# Patient Record
Sex: FEMALE | Race: WHITE | NOT HISPANIC OR LATINO | Employment: FULL TIME | URBAN - METROPOLITAN AREA
[De-identification: names, ages, dates, MRNs, and addresses within clinical notes are randomized per-mention and may not be internally consistent; named-entity substitution may affect disease eponyms.]

---

## 2023-12-28 ENCOUNTER — TRANSFERRED RECORDS (OUTPATIENT)
Dept: HEALTH INFORMATION MANAGEMENT | Facility: CLINIC | Age: 28
End: 2023-12-28
Payer: COMMERCIAL

## 2023-12-28 ENCOUNTER — PRE VISIT (OUTPATIENT)
Dept: MATERNAL FETAL MEDICINE | Facility: CLINIC | Age: 28
End: 2023-12-28
Payer: COMMERCIAL

## 2023-12-28 ENCOUNTER — MEDICAL CORRESPONDENCE (OUTPATIENT)
Dept: HEALTH INFORMATION MANAGEMENT | Facility: CLINIC | Age: 28
End: 2023-12-28
Payer: COMMERCIAL

## 2023-12-28 ENCOUNTER — TRANSCRIBE ORDERS (OUTPATIENT)
Dept: MATERNAL FETAL MEDICINE | Facility: CLINIC | Age: 28
End: 2023-12-28

## 2023-12-28 DIAGNOSIS — O26.90 PREGNANCY RELATED CONDITION, ANTEPARTUM: Primary | ICD-10-CM

## 2024-01-03 ENCOUNTER — HOSPITAL ENCOUNTER (OUTPATIENT)
Dept: ULTRASOUND IMAGING | Facility: CLINIC | Age: 29
Discharge: HOME | End: 2024-01-03
Attending: OBSTETRICS & GYNECOLOGY
Payer: COMMERCIAL

## 2024-01-03 ENCOUNTER — OFFICE VISIT (OUTPATIENT)
Dept: MATERNAL FETAL MEDICINE | Facility: CLINIC | Age: 29
End: 2024-01-03
Attending: OBSTETRICS & GYNECOLOGY
Payer: COMMERCIAL

## 2024-01-03 DIAGNOSIS — O35.EXX0 FETAL HYDRONEPHROSIS DURING PREGNANCY, ANTEPARTUM, SINGLE OR UNSPECIFIED FETUS: Primary | ICD-10-CM

## 2024-01-03 DIAGNOSIS — O26.90 PREGNANCY RELATED CONDITION, ANTEPARTUM: ICD-10-CM

## 2024-01-03 PROCEDURE — 99203 OFFICE O/P NEW LOW 30 MIN: CPT | Mod: 25 | Performed by: OBSTETRICS & GYNECOLOGY

## 2024-01-03 PROCEDURE — 76811 OB US DETAILED SNGL FETUS: CPT

## 2024-01-03 PROCEDURE — 76811 OB US DETAILED SNGL FETUS: CPT | Mod: 26 | Performed by: OBSTETRICS & GYNECOLOGY

## 2024-01-03 NOTE — PROGRESS NOTES
Please see full imaging report from [facility] program under imaging tab.    Thank-you for referring your patient for a comprehensive ultrasound.    I discussed the findings on today's ultrasound with the patient and her partner. I reviewed the limitations of ultrasound both in detecting aneuploidy and structural abnormalities.  Ultrasound can routinely detect 80-90% of structural abnormalities. She opted not to do aneuploidy screening in this pregnancy.    Today we reviewed that there is evidence of left sided urinary tract dilation seen today. We discussed that this is defined at her gestational age as a renal pelvis measuring > 7mm. We discussed that the measurement we obtained today was 17-22 mm on the left consistent with a diagnosis of urinary tract dilation (UTD). We reviewed that a pelvic measurement of 10 mm or greater, however, increases the risk for renal injury.  We discussed that the differential diagnosis for UTD includes ureteropelvic junction obstruction, a lower urinary tract obstruction/malformation, or reflux - findings today are most consistent with ureteropelvic junction obstruction (UPJ). Given the increased risk for renal injury we will attempt to arrange for a Pediatric Urology consultation prior to deliver in order to establish a  follow-up plan, but given the later gestational age this may not be possible. We have reached out to our pediatric urology team today. The baby's pediatrician should also be made aware to ensure appropriate follow-up is arranged. We briefly reviewed that postnatally, the baby should undergo ultrasound and workup with Pediatric Urology.     **If we are unable to arrange a pediatric urology consultation prior to birth, the delivery site pediatric team should be notified to arrange a  US and to discuss referral to pediatric urology. We have asked our team to contact the patient for recommendations for next steps after birth for the baby, especially if  they are unable to perform a consult prior to her delivery.     We discussed that with a normal BURT and a normal appearing right kidney, it is unanticipated that there will be any critical issues for  renal function at birth.     Return to primary provider for continued prenatal care.    If you have questions regarding today's evaluation or if we can be of further service, please contact the [facility].    **Fetal anomalies may be present but not detected**    I spent a total of 35 minutes on the date of this encounter including preparing to see the patient (reviewing medical records/tests), counseling and discussing the plan of care, documenting the visit in the electronic medical record, and communicating with other health care professionals and/or care coordination.    Erich Martinez MD  Maternal Fetal Medicine

## 2024-01-04 ENCOUNTER — PRE VISIT (OUTPATIENT)
Dept: UROLOGY | Facility: CLINIC | Age: 29
End: 2024-01-04
Payer: COMMERCIAL

## 2024-01-04 NOTE — TELEPHONE ENCOUNTER
Chart reviewed patient contact not needed prior to appointment all necessary results available and ready for visit.        Juan Abbott MA

## 2024-01-09 ENCOUNTER — VIRTUAL VISIT (OUTPATIENT)
Dept: UROLOGY | Facility: CLINIC | Age: 29
End: 2024-01-09
Attending: NURSE PRACTITIONER
Payer: COMMERCIAL

## 2024-01-09 DIAGNOSIS — O35.EXX0 FETAL HYDRONEPHROSIS DURING PREGNANCY, ANTEPARTUM, SINGLE OR UNSPECIFIED FETUS: ICD-10-CM

## 2024-01-09 PROCEDURE — 99213 OFFICE O/P EST LOW 20 MIN: CPT | Mod: 93 | Performed by: NURSE PRACTITIONER

## 2024-01-09 RX ORDER — FERROUS SULFATE 325(65) MG
65 TABLET, DELAYED RELEASE (ENTERIC COATED) ORAL
COMMUNITY

## 2024-01-09 RX ORDER — CEPHALEXIN 500 MG/1
500 CAPSULE ORAL 4 TIMES DAILY
COMMUNITY
Start: 2024-01-01

## 2024-01-09 RX ORDER — ESCITALOPRAM OXALATE 20 MG/1
20 TABLET ORAL DAILY
COMMUNITY
Start: 2023-09-21

## 2024-01-09 NOTE — LETTER
2024      RE: Donna Solomon  [address]  [facility] [address]     Dear Colleague,    Thank you for the opportunity to participate in the care of your patient, Donna Solomon, at the [facility] at [facility]. Please see a copy of my visit note below.    Shanice David [facility] [address], [place] [address]    RE:  Donna Solomon  :  1995  [place] MRN:  [id number]  Date of visit:  2024    Dear Dr. Martinez & Dr. David:    I had the pleasure of seeing your patient, Donna, today through the [facility] in urology consultation. Spoke via telephone visit  for the question of prenatally detected fetal hydronephrosis during pregnancy .  Please see below the details of this visit and my impression and plans discussed with the family.    CC:  fetal hydronephrosis during pregnancy     HPI:  Donna is a 28 year old woman whom I was asked to see in consultation for the above.  This is Donna first pregnancy.  The sex of the fetus is male.  At the 35 week ultrasound dilated left renal pelvis UTD A2/3 moderate risk 17.6 mm.  So far the pregnancy has been going well.  Donna is planning to deliver at [facility].  There is no family history of genitourinary disorders in childhood.  Induction scheduled for 2024.    PE:  GENERAL: Healthy, alert and no distress  RESP: No audible wheeze, cough.  PSYCH: Mentation appears normal, affect normal/bright, judgement and insight intact, normal speech.    Impression:  Dilated left renal pelvis UTD A2/3 moderate risk 17.6 mm    Plan:    We discussed the plan for  management including a renal ultrasound and VCUG around 2 weeks of life with subsequent follow up in our office.      Phone number to our nurse given to patient so she can call us when the baby is born to arrange follow up, information  also available on  AVS (After Visit Summary).    Our long term plan for the baby will be established after birth, but no need for immediate prophylactic antibiotics.    We discussed obtaining a screening VCUG to assess for reflux pending the results of baby's renal ultrasound vs. at the same time period as the ultrasound. Mom would like to do the ultrasound and VCUG at same time interval, around 2 weeks old.    We discussed the likely prenatal causes for this, including prenatal obstructive issues that have already resolved versus those that may need surgical help with resolution in childhood, as well as the possibility of vesicoureteral reflux.  We discussed the risks for urinary tract infection, and the pros and cons of starting the baby on daily, low-dose Amoxicillin, dosed at 10-15 mg/kg/d, which in this case we will likely not do.      I addressed all questions and encouraged a phone call from their MFM if there are any future concerns during the pregnancy.    Thank you very much for allowing me the opportunity to participate in this nice family's care with you.    Type of service:  Telephone visit  Phone call duration: Start time: 0946 Stop Time:0958 Total Time: 12 minutes  Originating Location (pt. Location): Home  Distant Location (provider location):  [facility]   Mode of Communication:  Clinic Phone    20 minutes spent on the date of the encounter doing chart review, history and exam, documentation, education and further activities per the note.    Sincerely,  ELMER Tong  Pediatric Urology  [facility]

## 2024-01-09 NOTE — PATIENT INSTRUCTIONS
[facility]   Department of Pediatric Urology    Dr. Bc Sifuentes, Pediatric Urologist  Dr. Marrufo, Pediatric Urologist  ELMER Abarca DNP CPNP Lindsay Barriere, DNP CFNP    [facility]  Nurse Coordinator:  [phone]    [facility]  Nurse Coordinator: [phone]    [facility]  Nurse Coordinator: [phone]      Once your baby is born, please do the following:    -After you have gone home, please call the Nurse Coordinator at your preferred  location and give her your baby s name and date of birth. She will  help coordinate the tests that need to be done about 2 weeks  after birth.    Your baby s will include:  -Renal Bladder Ultrasound  (all locations)  - Voiding Cystourethrogram (VCUG)  ([facility], [place], [place])

## 2024-01-09 NOTE — NURSING NOTE
Donna Solomon is a 28 year old female who is being evaluated via a billable telephone visit.      Donna Solomon complains of    Chief Complaint   Patient presents with    Consult     Consult for fetal L sided dilated pelvis        Patient is located in [place]? Yes     I have reviewed and updated the patient's medication list, allergies and preferred pharmacy.    Naina Mckeon LPN

## 2024-01-09 NOTE — PROGRESS NOTES
Shanice David [facility] [address] [address]    RE:  Donna Solomon  :  1995  [place] MRN:  [id number]  Date of visit:  2024    Dear Dr. Martinez & Dr. David:    I had the pleasure of seeing your patient, Donna, today through the [facility] [place] [facility] in urology consultation. Spoke via telephone visit  for the question of prenatally detected fetal hydronephrosis during pregnancy .  Please see below the details of this visit and my impression and plans discussed with the family.    CC:  fetal hydronephrosis during pregnancy     HPI:  Donna is a 28 year old woman whom I was asked to see in consultation for the above.  This is Donna first pregnancy.  The sex of the fetus is male.  At the 35 week ultrasound dilated left renal pelvis UTD A2/3 moderate risk 17.6 mm.  So far the pregnancy has been going well.  Donna is planning to deliver at [facility].  There is no family history of genitourinary disorders in childhood.  Induction scheduled for 2024.    PE:  GENERAL: Healthy, alert and no distress  RESP: No audible wheeze, cough.  PSYCH: Mentation appears normal, affect normal/bright, judgement and insight intact, normal speech.    Impression:  Dilated left renal pelvis UTD A2/3 moderate risk 17.6 mm    Plan:    We discussed the plan for  management including a renal ultrasound and VCUG around 2 weeks of life with subsequent follow up in our office.      Phone number to our nurse given to patient so she can call us when the baby is born to arrange follow up, information also available on  AVS (After Visit Summary).    Our long term plan for the baby will be established after birth, but no need for immediate prophylactic antibiotics.    We discussed obtaining a screening VCUG to assess for reflux pending the results of baby's renal ultrasound vs. at the same time period as the ultrasound. Mom would like to do the  ultrasound and VCUG at same time interval, around 2 weeks old.    We discussed the likely prenatal causes for this, including prenatal obstructive issues that have already resolved versus those that may need surgical help with resolution in childhood, as well as the possibility of vesicoureteral reflux.  We discussed the risks for urinary tract infection, and the pros and cons of starting the baby on daily, low-dose Amoxicillin, dosed at 10-15 mg/kg/d, which in this case we will likely not do.      I addressed all questions and encouraged a phone call from their MFM if there are any future concerns during the pregnancy.    Thank you very much for allowing me the opportunity to participate in this nice family's care with you.    Type of service:  Telephone visit  Phone call duration: Start time: 0946 Stop Time:0958 Total Time: 12 minutes  Originating Location (pt. Location): Home  Distant Location (provider location):  [facility]   Mode of Communication:  Clinic Phone    20 minutes spent on the date of the encounter doing chart review, history and exam, documentation, education and further activities per the note.    Sincerely,  Luzmaria BANKS, CPNP  Pediatric Urology  [facility]